# Patient Record
Sex: MALE | Race: WHITE | NOT HISPANIC OR LATINO
[De-identification: names, ages, dates, MRNs, and addresses within clinical notes are randomized per-mention and may not be internally consistent; named-entity substitution may affect disease eponyms.]

---

## 2022-03-11 ENCOUNTER — TRANSCRIPTION ENCOUNTER (OUTPATIENT)
Age: 67
End: 2022-03-11

## 2022-03-28 ENCOUNTER — TRANSCRIPTION ENCOUNTER (OUTPATIENT)
Age: 67
End: 2022-03-28

## 2022-06-13 ENCOUNTER — NON-APPOINTMENT (OUTPATIENT)
Age: 67
End: 2022-06-13

## 2022-07-26 ENCOUNTER — NON-APPOINTMENT (OUTPATIENT)
Age: 67
End: 2022-07-26

## 2023-01-19 PROBLEM — Z00.00 ENCOUNTER FOR PREVENTIVE HEALTH EXAMINATION: Status: ACTIVE | Noted: 2023-01-19

## 2023-01-20 ENCOUNTER — APPOINTMENT (OUTPATIENT)
Dept: PODIATRY | Facility: CLINIC | Age: 68
End: 2023-01-20
Payer: COMMERCIAL

## 2023-01-20 DIAGNOSIS — M21.70 UNEQUAL LIMB LENGTH (ACQUIRED), UNSPECIFIED SITE: ICD-10-CM

## 2023-01-20 DIAGNOSIS — M21.6X1 OTHER ACQUIRED DEFORMITIES OF RIGHT FOOT: ICD-10-CM

## 2023-01-20 DIAGNOSIS — M21.6X2 OTHER ACQUIRED DEFORMITIES OF RIGHT FOOT: ICD-10-CM

## 2023-01-20 DIAGNOSIS — I10 ESSENTIAL (PRIMARY) HYPERTENSION: ICD-10-CM

## 2023-01-20 PROCEDURE — 99203 OFFICE O/P NEW LOW 30 MIN: CPT

## 2023-01-20 NOTE — PHYSICAL EXAM
[Bilateral] : foot and ankle bilaterally [NL 30)] : inversion 30 degrees [NL (40)] : MTP joint DF 40 degrees [NL (20)] : MTP joint PF 20 degrees [5___] : [facility] 5[unfilled]/5 [2+] : posterior tibialis pulse: 2+ [Normal] : saphenous nerve sensation normal [] : not mildly antalgic [FreeTextEntry3] : none [FreeTextEntry8] : Equinus of ankle bilateral.

## 2023-01-20 NOTE — ASSESSMENT
[FreeTextEntry1] : Heels lifts discussed.\par Stretching of calves discussed\par Continue with PT. \par rto prn

## 2025-03-20 ENCOUNTER — EMERGENCY (EMERGENCY)
Facility: HOSPITAL | Age: 70
LOS: 1 days | Discharge: HOME | End: 2025-03-20
Attending: STUDENT IN AN ORGANIZED HEALTH CARE EDUCATION/TRAINING PROGRAM | Admitting: STUDENT IN AN ORGANIZED HEALTH CARE EDUCATION/TRAINING PROGRAM
Payer: COMMERCIAL

## 2025-03-20 VITALS
TEMPERATURE: 97 F | WEIGHT: 214.95 LBS | HEART RATE: 94 BPM | OXYGEN SATURATION: 99 % | HEIGHT: 75 IN | DIASTOLIC BLOOD PRESSURE: 95 MMHG | SYSTOLIC BLOOD PRESSURE: 152 MMHG | RESPIRATION RATE: 17 BRPM

## 2025-03-20 VITALS
DIASTOLIC BLOOD PRESSURE: 84 MMHG | OXYGEN SATURATION: 95 % | SYSTOLIC BLOOD PRESSURE: 153 MMHG | HEART RATE: 99 BPM | RESPIRATION RATE: 17 BRPM | TEMPERATURE: 98 F
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
## 2025-03-20 DIAGNOSIS — S00.81XA ABRASION OF OTHER PART OF HEAD, INITIAL ENCOUNTER: ICD-10-CM

## 2025-03-20 DIAGNOSIS — S09.90XA UNSPECIFIED INJURY OF HEAD, INITIAL ENCOUNTER: ICD-10-CM
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
## 2025-03-20 PROCEDURE — 99284 EMERGENCY DEPT VISIT MOD MDM: CPT | Mod: 25

## 2025-03-20 PROCEDURE — 70450 CT HEAD/BRAIN W/O DYE: CPT | Mod: MC

## 2025-03-20 PROCEDURE — 70450 CT HEAD/BRAIN W/O DYE: CPT | Mod: 26

## 2025-03-20 PROCEDURE — 99284 EMERGENCY DEPT VISIT MOD MDM: CPT

## 2025-03-20 NOTE — ED ADULT NURSE NOTE - NSFALLRISKINTERV_ED_ALL_ED

## 2025-03-20 NOTE — ED ADULT TRIAGE NOTE - CHIEF COMPLAINT QUOTE
Pt BIBEMS for mechanical trip and fall down 2 stairs, hit L side of head on wall. Approximately 2 cm laceration noted to L temporal region. Denies LOC, dizziness, anticoagulant use.

## 2025-03-20 NOTE — ED ADULT TRIAGE NOTE - GLASGOW COMA SCALE: EYE OPENING, MLM
Per Dr nAderson, MRI negative, patient did not have a stroke. Ok to discontinue the stroke protocol, will continue to monitor.    (E4) spontaneous

## 2025-03-20 NOTE — ED PROVIDER NOTE - NSFOLLOWUPINSTRUCTIONS_ED_ALL_ED_FT
Follow up with your primary medical doctor as soon as possible.    Return to the emergency department if your symptoms worsen or if you develop new symptoms.  If you have any problems with followup, please call the ED Referral Coordinator at [phone].    Fall Prevention in the Home, Adult    Falls can cause injuries and can affect people from all age groups. There are many simple things that you can do to make your home safe and to help prevent falls. Ask for help when making these changes, if needed.    What actions can I take to prevent falls?    General instructions:  •Use good lighting in all rooms. Replace any light bulbs that burn out.  •Turn on lights if it is dark. Use night-lights.  •Place frequently used items in easy-to-reach places. Lower the shelves around your home if necessary.  •Set up furniture so that there are clear paths around it. Avoid moving your furniture around.  •Remove throw rugs and other tripping hazards from the floor.  •Avoid walking on wet floors.  •Fix any uneven floor surfaces.  •Add color or contrast paint or tape to grab bars and handrails in your home. Place contrasting color strips on the first and last steps of stairways.  •When you use a stepladder, make sure that it is completely opened and that the sides are firmly locked. Have someone hold the ladder while you are using it. Do not climb a closed stepladder.  •Be aware of any and all pets.    What can I do in the bathroom?   •Keep the floor dry. Immediately clean up any water that spills onto the floor.  •Remove soap buildup in the tub or shower on a regular basis.  •Use non-skid mats or decals on the floor of the tub or shower.  •Attach bath mats securely with double-sided, non-slip rug tape.  •If you need to sit down while you are in the shower, use a plastic, non-slip stool.  •Install grab bars by the toilet and in the tub and shower. Do not use towel bars as grab bars.    What can I do in the bedroom?   •Make sure that a bedside light is easy to reach.  • Do not use oversized bedding that drapes onto the floor.  •Have a firm chair that has side arms to use for getting dressed.    What can I do in the kitchen?   •Clean up any spills right away.  •If you need to reach for something above you, use a sturdy step stool that has a grab bar.  •Keep electrical cables out of the way.  • Do not use floor polish or wax that makes floors slippery. If you must use wax, make sure that it is non-skid floor wax.    What can I do in the stairways?   • Do not leave any items on the stairs.  •Make sure that you have a light switch at the top of the stairs and the bottom of the stairs. Have them installed if you do not have them.  •Make sure that there are handrails on both sides of the stairs. Fix handrails that are broken or loose. Make sure that handrails are as long as the stairways.  •Install non-slip stair treads on all stairs in your home.  •Avoid having throw rugs at the top or bottom of stairways, or secure the rugs with carpet tape to prevent them from moving.  •Choose a carpet design that does not hide the edge of steps on the stairway.  •Check any carpeting to make sure that it is firmly attached to the stairs. Fix any carpet that is loose or worn.    What can I do on the outside of my home?   •Use bright outdoor lighting.  •Regularly repair the edges of walkways and driveways and fix any cracks.  •Remove high doorway thresholds.  •Trim any shrubbery on the main path into your home.  •Regularly check that handrails are securely fastened and in good repair. Both sides of any steps should have handrails.  •Install guardrails along the edges of any raised decks or porches.  •Clear walkways of debris and clutter, including tools and rocks.  •Have leaves, snow, and ice cleared regularly.  •Use sand or salt on walkways during winter months.  •In the garage, clean up any spills right away, including grease or oil spills.    What other actions can I take?   •Wear closed-toe shoes that fit well and support your feet. Wear shoes that have rubber soles or low heels.  •Use mobility aids as needed, such as canes, walkers, scooters, and crutches.  •Review your medicines with your health care provider. Some medicines can cause dizziness or changes in blood pressure, which increase your risk of falling.    Talk with your health care provider about other ways that you can decrease your risk of falls. This may include working with a physical therapist or  to improve your strength, balance, and endurance.    Where to find more information  •Centers for Disease Control and Prevention, [place]: https://www.cdc.gov  •[employer] [place] [employer] Aging: https://xl1vymu.keri.nih.gov    Contact a health care provider if:  •You are afraid of falling at home.  •You feel weak, drowsy, or dizzy at home.  •You fall at home.    Summary  •There are many simple things that you can do to make your home safe and to help prevent falls.  •Ways to make your home safe include removing tripping hazards and installing grab bars in the bathroom.  •Ask for help when making these changes in your home.  This information is not intended to replace advice given to you by your health care provider. Make sure you discuss any questions you have with your health care provider.

## 2025-03-20 NOTE — ED PROVIDER NOTE - PATIENT PORTAL LINK FT
You can access the FollowMyHealth Patient Portal offered by [facility] by registering at the following website: [email or web address]. By joining [employer]’s FollowMyHealth portal, you will also be able to view your health information using other applications (apps) compatible with our system.

## 2025-03-20 NOTE — ED ADULT NURSE NOTE - OBJECTIVE STATEMENT
Pt BIBEMS for mechanical trip and fall down 2 stairs, hit L side of head on wall. Approximately 2 cm laceration noted to L temporal region. Denies LOC, dizziness, blurry vision. Denies anticoagulant use. Ambulatory with steady gait.

## 2025-03-20 NOTE — ED PROVIDER NOTE - PHYSICAL EXAMINATION
CONSTITUTIONAL: Non-toxic; in no apparent distress  HEAD: Normocephalic; + R forehead abrasion  EYES: PERRL; EOM intact   ENMT: External appears normal  NECK: Supple; non-tender  CARD: Normal S1, S2; no murmurs, rubs, or gallops  RESP: Normal chest excursion with respiration; breath sounds clear and equal bilaterally  ABD: Soft, non-distended; non-tender  EXT: Normal ROM in all four extremities; non-tender to palpation, no peripheral edema  SKIN: Warm, dry, no rash  NEURO:  No focal neurological deficiencies, CN 2-12 intact BL, no dysmetria, no pronator drift, gait normal, strength/sensation intact throughout, normal cognition

## 2025-03-20 NOTE — ED PROVIDER NOTE - NS ED ROS FT
CONSTITUTIONAL: No fever, no chills, no fatigue  EYES: No eye redness, no visual changes  ENT: No ear pain, no sore throat  CARDIOVASCULAR: No chest pain, no palpitations  RESPIRATORY: No cough, no SOB  GI: No abdominal pain, no nausea, no vomiting, no constipation, no diarrhea  GENITOURINARY: No dysuria, no frequency, no hematuria  MUSCULOSKELETAL: No back pain, no joint pain, no myalgias  SKIN: No rash, no peripheral edema, + forehead abrasion  NEURO: No headache, no confusion    ALL OTHER SYSTEMS NEGATIVE.

## 2025-03-20 NOTE — ED PROVIDER NOTE - CLINICAL SUMMARY MEDICAL DECISION MAKING FREE TEXT BOX
Presents s/p mechanical fall w head injury  Pt is not on AC  Injury to L forehead  Will obtain: CT head  Abrasion cleansed and dressed  Tdap UTD

## 2025-03-20 NOTE — ED PROVIDER NOTE - OBJECTIVE STATEMENT
68 yo M w no pertinent PMH p/w L forehead abrasion caused by mechanical slip and fall. Pt has no other injuries. Head struck wall. No LOC or AC use. He is ambulatory. No other complaints.